# Patient Record
Sex: MALE | Race: OTHER | HISPANIC OR LATINO | URBAN - METROPOLITAN AREA
[De-identification: names, ages, dates, MRNs, and addresses within clinical notes are randomized per-mention and may not be internally consistent; named-entity substitution may affect disease eponyms.]

---

## 2020-07-20 ENCOUNTER — EMERGENCY (EMERGENCY)
Facility: HOSPITAL | Age: 45
LOS: 1 days | Discharge: HOME | End: 2020-07-20
Attending: EMERGENCY MEDICINE | Admitting: EMERGENCY MEDICINE
Payer: SELF-PAY

## 2020-07-20 VITALS
RESPIRATION RATE: 18 BRPM | SYSTOLIC BLOOD PRESSURE: 174 MMHG | TEMPERATURE: 97 F | DIASTOLIC BLOOD PRESSURE: 116 MMHG | OXYGEN SATURATION: 100 % | HEART RATE: 86 BPM

## 2020-07-20 VITALS
DIASTOLIC BLOOD PRESSURE: 111 MMHG | SYSTOLIC BLOOD PRESSURE: 155 MMHG | TEMPERATURE: 97 F | RESPIRATION RATE: 20 BRPM | OXYGEN SATURATION: 98 % | HEART RATE: 102 BPM

## 2020-07-20 LAB
ALBUMIN SERPL ELPH-MCNC: 4 G/DL — SIGNIFICANT CHANGE UP (ref 3.3–5)
ALP SERPL-CCNC: 51 U/L — SIGNIFICANT CHANGE UP (ref 40–120)
ALT FLD-CCNC: 77 U/L — HIGH (ref 4–41)
ANION GAP SERPL CALC-SCNC: 11 MMO/L — SIGNIFICANT CHANGE UP (ref 7–14)
APPEARANCE UR: CLEAR — SIGNIFICANT CHANGE UP
AST SERPL-CCNC: 27 U/L — SIGNIFICANT CHANGE UP (ref 4–40)
BACTERIA # UR AUTO: NEGATIVE — SIGNIFICANT CHANGE UP
BASOPHILS # BLD AUTO: 0.07 K/UL — SIGNIFICANT CHANGE UP (ref 0–0.2)
BASOPHILS NFR BLD AUTO: 1.3 % — SIGNIFICANT CHANGE UP (ref 0–2)
BILIRUB SERPL-MCNC: 1.2 MG/DL — SIGNIFICANT CHANGE UP (ref 0.2–1.2)
BILIRUB UR-MCNC: NEGATIVE — SIGNIFICANT CHANGE UP
BLOOD UR QL VISUAL: NEGATIVE — SIGNIFICANT CHANGE UP
BUN SERPL-MCNC: 13 MG/DL — SIGNIFICANT CHANGE UP (ref 7–23)
CALCIUM SERPL-MCNC: 9.4 MG/DL — SIGNIFICANT CHANGE UP (ref 8.4–10.5)
CHLORIDE SERPL-SCNC: 98 MMOL/L — SIGNIFICANT CHANGE UP (ref 98–107)
CO2 SERPL-SCNC: 23 MMOL/L — SIGNIFICANT CHANGE UP (ref 22–31)
COLOR SPEC: YELLOW — SIGNIFICANT CHANGE UP
CREAT SERPL-MCNC: 0.86 MG/DL — SIGNIFICANT CHANGE UP (ref 0.5–1.3)
EOSINOPHIL # BLD AUTO: 0.1 K/UL — SIGNIFICANT CHANGE UP (ref 0–0.5)
EOSINOPHIL NFR BLD AUTO: 1.8 % — SIGNIFICANT CHANGE UP (ref 0–6)
GLUCOSE SERPL-MCNC: 270 MG/DL — HIGH (ref 70–99)
GLUCOSE UR-MCNC: >1000 — HIGH
HCT VFR BLD CALC: 49.2 % — SIGNIFICANT CHANGE UP (ref 39–50)
HGB BLD-MCNC: 17.5 G/DL — HIGH (ref 13–17)
HYALINE CASTS # UR AUTO: NEGATIVE — SIGNIFICANT CHANGE UP
IMM GRANULOCYTES NFR BLD AUTO: 0.4 % — SIGNIFICANT CHANGE UP (ref 0–1.5)
KETONES UR-MCNC: NEGATIVE — SIGNIFICANT CHANGE UP
LEUKOCYTE ESTERASE UR-ACNC: NEGATIVE — SIGNIFICANT CHANGE UP
LYMPHOCYTES # BLD AUTO: 1.89 K/UL — SIGNIFICANT CHANGE UP (ref 1–3.3)
LYMPHOCYTES # BLD AUTO: 34.3 % — SIGNIFICANT CHANGE UP (ref 13–44)
MCHC RBC-ENTMCNC: 30.8 PG — SIGNIFICANT CHANGE UP (ref 27–34)
MCHC RBC-ENTMCNC: 35.6 % — SIGNIFICANT CHANGE UP (ref 32–36)
MCV RBC AUTO: 86.6 FL — SIGNIFICANT CHANGE UP (ref 80–100)
MONOCYTES # BLD AUTO: 0.46 K/UL — SIGNIFICANT CHANGE UP (ref 0–0.9)
MONOCYTES NFR BLD AUTO: 8.3 % — SIGNIFICANT CHANGE UP (ref 2–14)
NEUTROPHILS # BLD AUTO: 2.97 K/UL — SIGNIFICANT CHANGE UP (ref 1.8–7.4)
NEUTROPHILS NFR BLD AUTO: 53.9 % — SIGNIFICANT CHANGE UP (ref 43–77)
NITRITE UR-MCNC: NEGATIVE — SIGNIFICANT CHANGE UP
NRBC # FLD: 0 K/UL — SIGNIFICANT CHANGE UP (ref 0–0)
PH UR: 6 — SIGNIFICANT CHANGE UP (ref 5–8)
PLATELET # BLD AUTO: 166 K/UL — SIGNIFICANT CHANGE UP (ref 150–400)
PMV BLD: 10.7 FL — SIGNIFICANT CHANGE UP (ref 7–13)
POTASSIUM SERPL-MCNC: 4.1 MMOL/L — SIGNIFICANT CHANGE UP (ref 3.5–5.3)
POTASSIUM SERPL-SCNC: 4.1 MMOL/L — SIGNIFICANT CHANGE UP (ref 3.5–5.3)
PROT SERPL-MCNC: 7.2 G/DL — SIGNIFICANT CHANGE UP (ref 6–8.3)
PROT UR-MCNC: 50 — SIGNIFICANT CHANGE UP
RBC # BLD: 5.68 M/UL — SIGNIFICANT CHANGE UP (ref 4.2–5.8)
RBC # FLD: 13.4 % — SIGNIFICANT CHANGE UP (ref 10.3–14.5)
RBC CASTS # UR COMP ASSIST: SIGNIFICANT CHANGE UP (ref 0–?)
SODIUM SERPL-SCNC: 132 MMOL/L — LOW (ref 135–145)
SP GR SPEC: 1.03 — SIGNIFICANT CHANGE UP (ref 1–1.04)
SQUAMOUS # UR AUTO: SIGNIFICANT CHANGE UP
UROBILINOGEN FLD QL: NORMAL — SIGNIFICANT CHANGE UP
WBC # BLD: 5.51 K/UL — SIGNIFICANT CHANGE UP (ref 3.8–10.5)
WBC # FLD AUTO: 5.51 K/UL — SIGNIFICANT CHANGE UP (ref 3.8–10.5)
WBC UR QL: SIGNIFICANT CHANGE UP (ref 0–?)

## 2020-07-20 PROCEDURE — 99284 EMERGENCY DEPT VISIT MOD MDM: CPT

## 2020-07-20 PROCEDURE — 99053 MED SERV 10PM-8AM 24 HR FAC: CPT

## 2020-07-20 PROCEDURE — 93971 EXTREMITY STUDY: CPT | Mod: 26,LT

## 2020-07-20 RX ORDER — KETOROLAC TROMETHAMINE 30 MG/ML
30 SYRINGE (ML) INJECTION ONCE
Refills: 0 | Status: DISCONTINUED | OUTPATIENT
Start: 2020-07-20 | End: 2020-07-20

## 2020-07-20 RX ORDER — SODIUM CHLORIDE 9 MG/ML
1000 INJECTION INTRAMUSCULAR; INTRAVENOUS; SUBCUTANEOUS ONCE
Refills: 0 | Status: COMPLETED | OUTPATIENT
Start: 2020-07-20 | End: 2020-07-20

## 2020-07-20 RX ADMIN — Medication 30 MILLIGRAM(S): at 11:22

## 2020-07-20 RX ADMIN — SODIUM CHLORIDE 1000 MILLILITER(S): 9 INJECTION INTRAMUSCULAR; INTRAVENOUS; SUBCUTANEOUS at 09:38

## 2020-07-20 NOTE — ED PROVIDER NOTE - PATIENT PORTAL LINK FT
You can access the FollowMyHealth Patient Portal offered by [facility] by registering at the following website: [email or web address]. By joining [facility]’s FollowMyHealth portal, you will also be able to view your health information using other applications (apps) compatible with our system.

## 2020-07-20 NOTE — ED PROVIDER NOTE - CLINICAL SUMMARY MEDICAL DECISION MAKING FREE TEXT BOX
46yo M pmhx htn dm pe/dvt w/ ivc filter p/w CC vague low back pain for a week, which seems musculoskeletal in origin, and LLE swelling/discomfort, will send basic screening labs, ua, US to eval for DVT of LLE.

## 2020-07-20 NOTE — ED ADULT TRIAGE NOTE - CHIEF COMPLAINT QUOTE
"I noticed I started to have blurry vision to my left eye 1.5 hours ago on and off. I also have pain to my left back x 10 days radiating down to my legs" "I noticed I started to have blurry vision to my left eye 1.5 hours ago on and off. I also have pain to my left back x 10 days radiating down to my legs" see by resident in charge. no stroke code at this time. patient now stating also had blurry vision yesterday at 4P

## 2020-07-20 NOTE — ED PROVIDER NOTE - NSFOLLOWUPINSTRUCTIONS_ED_ALL_ED_FT
Hyperglycemia occurs when the level of sugar (glucose) in the blood is too high. Glucose is a type of sugar that provides the body's main source of energy. Certain hormones (insulin and glucagon) control the level of glucose in the blood. Insulin lowers blood glucose, and glucagon increases blood glucose. Hyperglycemia can result from having too little insulin in the bloodstream, or from the body not responding normally to insulin.  Hyperglycemia occurs most often in people who have diabetes (diabetes mellitus), but it can happen in people who do not have diabetes. It can develop quickly, and it can be life-threatening if it causes you to become severely dehydrated (diabetic ketoacidosis or hyperglycemic hyperosmolar state). Severe hyperglycemia is a medical emergency.  What are the causes?  If you have diabetes, hyperglycemia may be caused by:  Diabetes medicine.Medicines that increase blood glucose or affect your diabetes control.Not eating enough, or not eating often enough.Changes in physical activity level.Being sick or having an infection.If you have prediabetes or undiagnosed diabetes:  Hyperglycemia may be caused by those conditions.If you do not have diabetes, hyperglycemia may be caused by:  Certain medicines, including steroid medicines, beta-blockers, epinephrine, and thiazide diuretics.Stress.Serious illness.Surgery.Diseases of the pancreas.Infection.What increases the risk?  Hyperglycemia is more likely to develop in people who have risk factors for diabetes, such as:  Having a family member with diabetes.Having a gene for type 1 diabetes that is passed from parent to child (inherited).Living in an area with cold weather conditions.Exposure to certain viruses.Certain conditions in which the body's disease-fighting (immune) system attacks itself (autoimmune disorders).Being overweight or obese.Having an inactive (sedentary) lifestyle.Having been diagnosed with insulin resistance.Having a history of prediabetes, gestational diabetes, or polycystic ovarian syndrome (PCOS).Being of American[place], -American, /, or / descent.What are the signs or symptoms?  Hyperglycemia may not cause any symptoms. If you do have symptoms, they may include early warning signs, such as:  Increased thirst.Hunger.Feeling very tired.Needing to urinate more often than usual.Blurry vision.Other symptoms may develop if hyperglycemia gets worse, such as:  Dry mouth.Loss of appetite.Fruity-smelling breath.Weakness.Unexpected or rapid weight gain or weight loss.Tingling or numbness in the hands or feet.Headache.Skin that does not quickly return to normal after being lightly pinched and released (poor skin turgor).Abdominal pain.Cuts or bruises that are slow to heal.How is this diagnosed?  Hyperglycemia is diagnosed with a blood test to measure your blood glucose level. This blood test is usually done while you are having symptoms. Your health care provider may also do a physical exam and review your medical history.  You may have more tests to determine the cause of your hyperglycemia, such as:  A fasting blood glucose (FBG) test. You will not be allowed to eat (you will fast) for at least 8 hours before a blood sample is taken.An A1c (hemoglobin A1c) blood test. This provides information about blood glucose control over the previous 2–3 months.An oral glucose tolerance test (OGTT). This measures your blood glucose at two times:  After fasting. This is your baseline blood glucose level.Two hours after drinking a beverage that contains glucose.How is this treated?  Treatment depends on the cause of your hyperglycemia. Treatment may include:  Taking medicine to regulate your blood glucose levels. If you take insulin or other diabetes medicines, your medicine or dosage may be adjusted.Lifestyle changes, such as exercising more, eating healthier foods, or losing weight.Treating an illness or infection, if this caused your hyperglycemia.Checking your blood glucose more often.Stopping or reducing steroid medicines, if these caused your hyperglycemia.If your hyperglycemia becomes severe and it results in hyperglycemic hyperosmolar state, you must be hospitalized and given IV fluids.  Follow these instructions at home:     General instructions     Take over-the-counter and prescription medicines only as told by your health care provider. Do not use any products that contain nicotine or tobacco, such as cigarettes and e-cigarettes. If you need help quitting, ask your health care provider. Limit alcohol intake to no more than 1 drink per day for nonpregnant women and 2 drinks per day for men. One drink equals 12 oz of beer, 5 oz of wine, or 1½ oz of hard liquor. Learn to manage stress. If you need help with this, ask your health care provider. Keep all follow-up visits as told by your health care provider. This is important. Eating and drinking        Maintain a healthy weight. Exercise regularly, as directed by your health care provider. Stay hydrated, especially when you exercise, get sick, or spend time in hot temperatures. Eat healthy foods, such as:  Lean proteins. Complex carbohydrates. Fresh fruits and vegetables. Low-fat dairy products. Healthy fats. Drink enough fluid to keep your urine clear or pale yellow. If you have diabetes:     Make sure you know the symptoms of hyperglycemia. Follow your diabetes management plan, as told by your health care provider. Make sure you:  Take your insulin and medicines as directed. Follow your exercise plan. Follow your meal plan. Eat on time, and do not skip meals. Check your blood glucose as often as directed. Make sure to check your blood glucose before and after exercise. If you exercise longer or in a different way than usual, check your blood glucose more often. Follow your sick day plan whenever you cannot eat or drink normally. Make this plan in advance with your health care provider. Share your diabetes management plan with people in your workplace, school, and household. Check your urine for ketones when you are ill and as told by your health care provider. Carry a medical alert card or wear medical alert jewelry. Contact a health care provider if:  Your blood glucose is at or above 240 mg/dL (13.3 mmol/L) for 2 days in a row. You have problems keeping your blood glucose in your target range. You have frequent episodes of hyperglycemia. Get help right away if:  You have difficulty breathing. You have a change in how you think, feel, or act (mental status).You have nausea or vomiting that does not go away. These symptoms may represent a serious problem that is an emergency. Do not wait to see if the symptoms will go away. Get medical help right away. Call your local emergency services (911 in the [place] Do not drive yourself to the hospital.   Summary  Hyperglycemia occurs when the level of sugar (glucose) in the blood is too high. Hyperglycemia is diagnosed with a blood test to measure your blood glucose level. This blood test is usually done while you are having symptoms. Your health care provider may also do a physical exam and review your medical history. If you have diabetes, follow your diabetes management plan as told by your health care provider. Contact your health care provider if you have problems keeping your blood glucose in your target range. This information is not intended to replace advice given to you by your health care provider. Make sure you discuss any questions you have with your health care provider.

## 2020-07-20 NOTE — ED ADULT NURSE NOTE - CHIEF COMPLAINT QUOTE
"I noticed I started to have blurry vision to my left eye 1.5 hours ago on and off. I also have pain to my left back x 10 days radiating down to my legs" see by resident in charge. no stroke code at this time. patient now stating also had blurry vision yesterday at 4P

## 2020-07-20 NOTE — ED PROVIDER NOTE - OBJECTIVE STATEMENT
46yo M pmhx HTN DM provoked PE/DVT not on AC s/p IVC filter in 2003 p/w CC LLE pain/swelling and L sided back pain, despite what is stated in the triage note pt. denies any blurred vision or visual changes at this time, he reported a brief episode which resolved spontaneously after a few minutes. Pt. describes L sided flank pain which is worse with walking, he also is concerned about the possibility of developing another DVT and feels that is LLE has been swollen over the past few days. He denies fever chills CP SOB cough n/v/d abd pain numbness/tingling/weakness. 44yo M pmhx HTN DM provoked PE/DVT not on AC s/p IVC filter in 2003 p/w CC LLE pain/swelling and L sided back pain ongoing x10 days, despite what is stated in the triage note pt. denies any blurred vision or visual changes at this time, he reported a brief episode which resolved spontaneously after a few minutes. Pt. describes L sided flank pain which is worse with walking, he also is concerned about the possibility of developing another DVT and feels that is LLE has been swollen over the past few days. He denies fever chills CP SOB cough n/v/d abd pain numbness/tingling/weakness.

## 2020-07-20 NOTE — ED PROVIDER NOTE - ATTENDING CONTRIBUTION TO CARE
agree with fellow note    "44yo M pmhx HTN DM provoked PE/DVT not on AC s/p IVC filter in 2003 p/w CC LLE pain/swelling and L sided back pain, despite what is stated in the triage note pt. denies any blurred vision or visual changes at this time, he reported a brief episode which resolved spontaneously after a few minutes. Pt. describes L sided flank pain which is worse with walking, he also is concerned about the possibility of developing another DVT and feels that is LLE has been swollen over the past few days. He denies fever chills CP SOB cough n/v/d abd pain numbness/tingling/weakness."  Pt denies blurry vision as his primary complaint.  States lasted a few seconds and has no issues    PE:  well appearing; hypertensive; visual acuity intact; no injection; EOMI; CTAB/L; s1 s2 no m/r/g abd soft/NT/ND back; no CVA tenderness ext: no edema

## 2020-07-20 NOTE — ED PROVIDER NOTE - PROGRESS NOTE DETAILS
Claudio Fuentes DO PGY3 - called for stroke eval. Intermittent blurriness of vision of L eye since 4pm yesterday, no c/o focal weakness/numbness. Neuro exam intact, no facial droop, pronator drift, sensation itnact and symmetric b/l, and 5/5 strength UE/LE b/l. No stroke code called at this time. Pt to be brought to main for further eval. Requesting pain meds for his back before he leaves. Did not take any meds today. Will give toradol.

## 2020-07-20 NOTE — ED ADULT NURSE NOTE - OBJECTIVE STATEMENT
Pt c/o left sided pain leg and back pain. Ambulatory without assistance. IVL placed. Bloods drawn. Will continue to monitor.